# Patient Record
Sex: FEMALE | Race: BLACK OR AFRICAN AMERICAN | HISPANIC OR LATINO | URBAN - NONMETROPOLITAN AREA
[De-identification: names, ages, dates, MRNs, and addresses within clinical notes are randomized per-mention and may not be internally consistent; named-entity substitution may affect disease eponyms.]

---
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
## 2023-01-14 ENCOUNTER — HOSPITAL ENCOUNTER (EMERGENCY)
Facility: HOSPITAL | Age: 18
Discharge: HOME | End: 2023-01-14
Attending: EMERGENCY MEDICINE

## 2023-01-14 VITALS
OXYGEN SATURATION: 99 % | DIASTOLIC BLOOD PRESSURE: 69 MMHG | HEART RATE: 108 BPM | SYSTOLIC BLOOD PRESSURE: 130 MMHG | RESPIRATION RATE: 16 BRPM | TEMPERATURE: 97.2 F

## 2023-01-14 DIAGNOSIS — O23.40 UTI IN PREGNANCY: ICD-10-CM

## 2023-01-14 DIAGNOSIS — R10.2 PELVIC CRAMPING: Primary | ICD-10-CM

## 2023-01-14 LAB
ABO GROUP BLD: NORMAL
ABO GROUP BLD: NORMAL
ALBUMIN SERPL BCP-MCNC: 3.6 G/DL (ref 3.5–5)
ALP SERPL-CCNC: 43 U/L (ref 46–384)
ALT SERPL W P-5'-P-CCNC: 18 U/L (ref 12–78)
ANION GAP SERPL CALCULATED.3IONS-SCNC: 9 MMOL/L (ref 4–13)
AST SERPL W P-5'-P-CCNC: 18 U/L (ref 5–45)
BACTERIA UR QL AUTO: ABNORMAL /HPF
BASOPHILS # BLD AUTO: 0.05 THOUSANDS/ÂΜL (ref 0–0.1)
BASOPHILS NFR BLD AUTO: 1 % (ref 0–1)
BILIRUB SERPL-MCNC: 0.18 MG/DL (ref 0.2–1)
BILIRUB UR QL STRIP: NEGATIVE
BLD GP AB SCN SERPL QL: NEGATIVE
BUN SERPL-MCNC: 11 MG/DL (ref 5–25)
CALCIUM SERPL-MCNC: 9.3 MG/DL (ref 8.3–10.1)
CHLORIDE SERPL-SCNC: 102 MMOL/L (ref 96–108)
CLARITY UR: ABNORMAL
CO2 SERPL-SCNC: 24 MMOL/L (ref 21–32)
COLOR UR: YELLOW
CREAT SERPL-MCNC: 0.59 MG/DL (ref 0.6–1.3)
EOSINOPHIL # BLD AUTO: 0.16 THOUSAND/ÂΜL (ref 0–0.61)
EOSINOPHIL NFR BLD AUTO: 2 % (ref 0–6)
ERYTHROCYTE [DISTWIDTH] IN BLOOD BY AUTOMATED COUNT: 12.5 % (ref 11.6–15.1)
EXT PREGNANCY TEST URINE: POSITIVE
EXT. CONTROL: ABNORMAL
GFR SERPL CREATININE-BSD FRML MDRD: 133 ML/MIN/1.73SQ M
GLUCOSE SERPL-MCNC: 70 MG/DL (ref 65–140)
GLUCOSE UR STRIP-MCNC: NEGATIVE MG/DL
HCT VFR BLD AUTO: 35.6 % (ref 34.8–46.1)
HGB BLD-MCNC: 12.4 G/DL (ref 11.5–15.4)
HGB UR QL STRIP.AUTO: NEGATIVE
IMM GRANULOCYTES # BLD AUTO: 0.04 THOUSAND/UL (ref 0–0.2)
IMM GRANULOCYTES NFR BLD AUTO: 0 % (ref 0–2)
KETONES UR STRIP-MCNC: NEGATIVE MG/DL
LEUKOCYTE ESTERASE UR QL STRIP: ABNORMAL
LYMPHOCYTES # BLD AUTO: 3.17 THOUSANDS/ÂΜL (ref 0.6–4.47)
LYMPHOCYTES NFR BLD AUTO: 33 % (ref 14–44)
MCH RBC QN AUTO: 30.6 PG (ref 26.8–34.3)
MCHC RBC AUTO-ENTMCNC: 34.8 G/DL (ref 31.4–37.4)
MCV RBC AUTO: 88 FL (ref 82–98)
MONOCYTES # BLD AUTO: 0.61 THOUSAND/ÂΜL (ref 0.17–1.22)
MONOCYTES NFR BLD AUTO: 6 % (ref 4–12)
NEUTROPHILS # BLD AUTO: 5.61 THOUSANDS/ÂΜL (ref 1.85–7.62)
NEUTS SEG NFR BLD AUTO: 58 % (ref 43–75)
NITRITE UR QL STRIP: NEGATIVE
NON-SQ EPI CELLS URNS QL MICRO: ABNORMAL /HPF
NRBC BLD AUTO-RTO: 0 /100 WBCS
OTHER STN SPEC: ABNORMAL
PH UR STRIP.AUTO: 6 [PH]
PLATELET # BLD AUTO: 210 THOUSANDS/UL (ref 149–390)
PMV BLD AUTO: 11.1 FL (ref 8.9–12.7)
POTASSIUM SERPL-SCNC: 3.3 MMOL/L (ref 3.5–5.3)
PROT SERPL-MCNC: 7.4 G/DL (ref 6.4–8.4)
PROT UR STRIP-MCNC: NEGATIVE MG/DL
RBC # BLD AUTO: 4.05 MILLION/UL (ref 3.81–5.12)
RBC #/AREA URNS AUTO: ABNORMAL /HPF
RH BLD: NEGATIVE
RH BLD: NEGATIVE
SODIUM SERPL-SCNC: 135 MMOL/L (ref 135–147)
SP GR UR STRIP.AUTO: >=1.03 (ref 1–1.03)
SPECIMEN EXPIRATION DATE: NORMAL
UROBILINOGEN UR QL STRIP.AUTO: 0.2 E.U./DL
WBC # BLD AUTO: 9.64 THOUSAND/UL (ref 4.31–10.16)
WBC #/AREA URNS AUTO: ABNORMAL /HPF

## 2023-01-14 RX ORDER — CEPHALEXIN 250 MG/1
500 CAPSULE ORAL ONCE
Status: COMPLETED | OUTPATIENT
Start: 2023-01-14 | End: 2023-01-14

## 2023-01-14 RX ORDER — CEPHALEXIN 500 MG/1
500 CAPSULE ORAL EVERY 6 HOURS SCHEDULED
Qty: 28 CAPSULE | Refills: 0 | Status: SHIPPED | OUTPATIENT
Start: 2023-01-14 | End: 2023-01-17 | Stop reason: ALTCHOICE

## 2023-01-14 RX ADMIN — CEPHALEXIN 500 MG: 250 CAPSULE ORAL at 04:53

## 2023-01-14 NOTE — DISCHARGE INSTRUCTIONS
Please follow up with OB for further management of your pregnancy  Their information is above  Please take keflex as prescribed  Follow all return precautions  Thank you

## 2023-01-14 NOTE — ED PROVIDER NOTES
History  Chief Complaint   Patient presents with   • Medical Problem     Pt states she thinks shes pregnant     12-year-old female, recent positive pregnancy test, , who presents for concern for miscarriage  Patient states that she has had multiple positive pregnancy tests at home  She describes that she has had some light bleeding, however this was noticed approximately a month ago, she has not had bleeding since  She denies any active bleeding  No active vaginal discharge  Does describe some mild abdominal cramping and states that she thinks she may have a UTI  She also describes some frequency  No dysuria  No flank pain  No lightheadedness  ROS otherwise negative  None       History reviewed  No pertinent past medical history  History reviewed  No pertinent surgical history  History reviewed  No pertinent family history  I have reviewed and agree with the history as documented  E-Cigarette/Vaping   • E-Cigarette Use Current Every Day User      E-Cigarette/Vaping Substances   • Nicotine Yes    • THC No    • CBD No    • Flavoring Yes      Social History     Tobacco Use   • Smoking status: Never   • Smokeless tobacco: Never   Vaping Use   • Vaping Use: Every day   • Substances: Nicotine, Flavoring   Substance Use Topics   • Alcohol use: Yes     Alcohol/week: 1 0 standard drink     Types: 1 Standard drinks or equivalent per week   • Drug use: Yes     Types: Marijuana       Review of Systems   Constitutional: Negative for chills and fever  HENT: Negative for congestion, rhinorrhea and sore throat  Respiratory: Negative for cough and shortness of breath  Cardiovascular: Negative for chest pain and palpitations  Gastrointestinal: Positive for abdominal pain  Negative for constipation, diarrhea, nausea and vomiting  Genitourinary: Positive for frequency  Negative for difficulty urinating, flank pain and vaginal bleeding  Musculoskeletal: Negative for arthralgias  Neurological: Negative for dizziness, weakness, light-headedness and headaches  Psychiatric/Behavioral: Negative for agitation, behavioral problems and confusion  All other systems reviewed and are negative  Physical Exam  Physical Exam  Constitutional:       Appearance: She is well-developed  Comments: Well-appearing   HENT:      Head: Normocephalic and atraumatic  Right Ear: Tympanic membrane normal       Left Ear: Tympanic membrane normal       Nose: Nose normal       Mouth/Throat:      Mouth: Mucous membranes are moist    Eyes:      Pupils: Pupils are equal, round, and reactive to light  Cardiovascular:      Rate and Rhythm: Normal rate and regular rhythm  Heart sounds: Normal heart sounds  No murmur heard  No friction rub  Pulmonary:      Effort: Pulmonary effort is normal  No respiratory distress  Breath sounds: Normal breath sounds  No wheezing or rales  Abdominal:      General: Bowel sounds are normal  There is no distension  Palpations: Abdomen is soft  Tenderness: There is no abdominal tenderness  There is no right CVA tenderness or left CVA tenderness  Musculoskeletal:         General: Normal range of motion  Cervical back: Normal range of motion and neck supple  Skin:     General: Skin is warm  Neurological:      Mental Status: She is alert and oriented to person, place, and time  Coordination: Coordination normal    Psychiatric:         Behavior: Behavior normal          Thought Content:  Thought content normal          Judgment: Judgment normal          Vital Signs  ED Triage Vitals [01/14/23 0354]   Temperature Pulse Respirations Blood Pressure SpO2   (!) 97 2 °F (36 2 °C) 103 16 139/81 100 %      Temp Source Heart Rate Source Patient Position - Orthostatic VS BP Location FiO2 (%)   Temporal Monitor Sitting Left arm --      Pain Score       No Pain           Vitals:    01/14/23 0354 01/14/23 0453   BP: 139/81 130/69   Pulse: 103 (!) 108 Patient Position - Orthostatic VS: Sitting Sitting         Visual Acuity      ED Medications  Medications   cephalexin (KEFLEX) capsule 500 mg (500 mg Oral Given 1/14/23 0453)       Diagnostic Studies  Results Reviewed     Procedure Component Value Units Date/Time    Comprehensive metabolic panel [[id number]]  (Abnormal) Collected: 01/14/23 0402    Lab Status: Final result Specimen: Blood from Arm, Right Updated: 01/14/23 0426     Sodium 135 mmol/L      Potassium 3 3 mmol/L      Chloride 102 mmol/L      CO2 24 mmol/L      ANION GAP 9 mmol/L      BUN 11 mg/dL      Creatinine 0 59 mg/dL      Glucose 70 mg/dL      Calcium 9 3 mg/dL      AST 18 U/L      ALT 18 U/L      Alkaline Phosphatase 43 U/L      Total Protein 7 4 g/dL      Albumin 3 6 g/dL      Total Bilirubin 0 18 mg/dL      eGFR 133 ml/min/1 73sq m     Narrative:      [place] guidelines for Chronic Kidney Disease (CKD):   •  Stage 1 with normal or high GFR (GFR > 90 mL/min/1 73 square meters)  •  Stage 2 Mild CKD (GFR = 60-89 mL/min/1 73 square meters)  •  Stage 3A Moderate CKD (GFR = 45-59 mL/min/1 73 square meters)  •  Stage 3B Moderate CKD (GFR = 30-44 mL/min/1 73 square meters)  •  Stage 4 Severe CKD (GFR = 15-29 mL/min/1 73 square meters)  •  Stage 5 End Stage CKD (GFR <15 mL/min/1 73 square meters)  Note: GFR calculation is accurate only with a steady state creatinine    Urine Microscopic [[id number]]  (Abnormal) Collected: 01/14/23 0405    Lab Status: Final result Specimen: Urine, Clean Catch Updated: 01/14/23 0424     RBC, UA 0-1 /hpf      WBC, UA 10-20 /hpf      Epithelial Cells Moderate /hpf      Bacteria, UA Innumerable /hpf      OTHER OBSERVATIONS Yeast Cells Present    Urine culture [[id number]] Collected: 01/14/23 0405    Lab Status:  In process Specimen: Urine, Clean Catch Updated: 01/14/23 0424    UA w Reflex to Microscopic w Reflex to Culture [[id number]]  (Abnormal) Collected: 01/14/23 0405    Lab Status: Final result Specimen: Urine, Clean Catch Updated: 01/14/23 0414     Color, UA Yellow     Clarity, UA Slightly Cloudy     Specific Gravity, UA >=1 030     pH, UA 6 0     Leukocytes, UA Small     Nitrite, UA Negative     Protein, UA Negative mg/dl      Glucose, UA Negative mg/dl      Ketones, UA Negative mg/dl      Urobilinogen, UA 0 2 E U /dl      Bilirubin, UA Negative     Occult Blood, UA Negative    CBC and differential [[id number]] Collected: 01/14/23 0402    Lab Status: Final result Specimen: Blood from Arm, Right Updated: 01/14/23 0413     WBC 9 64 Thousand/uL      RBC 4 05 Million/uL      Hemoglobin 12 4 g/dL      Hematocrit 35 6 %      MCV 88 fL      MCH 30 6 pg      MCHC 34 8 g/dL      RDW 12 5 %      MPV 11 1 fL      Platelets 600 Thousands/uL      nRBC 0 /100 WBCs      Neutrophils Relative 58 %      Immat GRANS % 0 %      Lymphocytes Relative 33 %      Monocytes Relative 6 %      Eosinophils Relative 2 %      Basophils Relative 1 %      Neutrophils Absolute 5 61 Thousands/µL      Immature Grans Absolute 0 04 Thousand/uL      Lymphocytes Absolute 3 17 Thousands/µL      Monocytes Absolute 0 61 Thousand/µL      Eosinophils Absolute 0 16 Thousand/µL      Basophils Absolute 0 05 Thousands/µL     POCT pregnancy, urine [[id number]]  (Abnormal) Resulted: 01/14/23 0405    Lab Status: Final result Updated: 01/14/23 0405     EXT Preg Test, Ur Positive     Control Valid                 No orders to display              Procedures  POC Pelvic US    Date/Time: 1/14/2023 4:39 AM  Performed by: Natty Matias MD  Authorized by: Natty Matias MD     Patient location:  ED  Other Assisting Provider: No    Procedure details:     Exam Type:  Diagnostic    Indications: evaluate for IUP      Assessment for: confirm intrauterine pregnancy and evaluate fetal viability      Technique:  Transabdominal obstetric (HCG+) exam    Image quality: diagnostic      Image availability:  Images available in PACS  Uterine findings:     Intrauterine pregnancy: identified      Single gestation: identified      Fetal heart rate: identified      Fetal heart rate (bpm):  166  Other findings:     Free pelvic fluid: not identified      Free peritoneal fluid: not identified    Interpretation:     Ultrasound impressions: normal      Pregnancy findings: intrauterine pregnancy (IUP)               ED Course  ED Course as of 23 1630   Sat 2023   0431 Bacteria, UA(!): Innumerable   0431 Leukocytes, UA(!): Small                                             Medical Decision Making  Differential includes UTI, spontaneous , ectopic pregnancy  Patient's presentation is very minimally concerning for spontaneous  at this time, given that she had light bleeding approximately 1 month ago and has not had any bleeding since  Bedside ultrasound demonstrated an intrauterine pregnancy with a fetal heart rate of 166  I discussed the case case with OB, and given timeline of bleeding, with no active bleeding, as well as how early the pregnancy is given her last period was , will not give RhoGAM at this time  Patient's UA was consistent with UTI, started Keflex in the emergency department and discharged with a prescription  I also provided OB follow-up as the patient has not follow-up with an obstetrician yet  Should return precautions were given and the patient was discharged  Pelvic cramping: acute illness or injury  UTI in pregnancy: acute illness or injury  Amount and/or Complexity of Data Reviewed  Labs: ordered  Decision-making details documented in ED Course  Risk  Prescription drug management            Disposition  Final diagnoses:   Pelvic cramping   UTI in pregnancy     Time reflects when diagnosis was documented in both MDM as applicable and the Disposition within this note     Time User Action Codes Description Comment    2023  4:47 AM Carmelita Drake Add [N39 0] UTI (urinary tract infection)     1/14/2023  4:47 AM Noelle, Miah Baxter Add [R10 2] Pelvic cramping     1/14/2023  4:47 AM Chris Fraser Modify [R10 2] Pelvic cramping     1/14/2023  4:47 AM Noelle, Miah Baxter Remove [N39 0] UTI (urinary tract infection)     1/14/2023  4:47 AM Noelle, Miah Baxter Add [O23 40] UTI in pregnancy       ED Disposition     ED Disposition   Discharge    Condition   Stable    Date/Time   Sat Jan 14, 2023  4:47 AM    Comment   Liset Hardy discharge to home/self care  Follow-up Information     Follow up With Specialties Details Why Contact Info Additional [address] [facility] Call  For re-evaluation [address] [id number]  [facility] [address] [place], [place], [address]          Discharge Medication List as of 1/14/2023  4:50 AM      START taking these medications    Details   cephalexin (KEFLEX) 500 mg capsule Take 1 capsule (500 mg total) by mouth every 6 (six) hours for 7 days, Starting Sat 1/14/2023, Until Sat 1/21/2023, Normal             No discharge procedures on file      PDMP Review     None          ED Provider  Electronically Signed by           Christi Gonzalez MD  01/14/23 3599

## 2023-01-17 DIAGNOSIS — O23.40 URINARY TRACT INFECTION IN MOTHER DURING PREGNANCY, ANTEPARTUM: Primary | ICD-10-CM

## 2023-01-17 LAB — BACTERIA UR CULT: ABNORMAL

## 2023-01-17 RX ORDER — CEFUROXIME AXETIL 500 MG/1
500 TABLET ORAL EVERY 12 HOURS SCHEDULED
Qty: 14 TABLET | Refills: 0 | Status: SHIPPED | OUTPATIENT
Start: 2023-01-17 | End: 2023-01-24

## 2023-01-17 NOTE — RESULT ENCOUNTER NOTE
Number for patient and significant other not in service  Called grandmother, no answer  Mailbox full, unable to leave message  Needs antibiotics changed

## 2023-01-17 NOTE — RESULT ENCOUNTER NOTE
Boyfriend's grandmother called back and put patient on phone  Verified name and   Advised to stop keflex, start ceftin 500 bid x 7 days  Sent to preferred pharmacy   Also advised to schedule f/u with obgyn

## 2023-01-21 ENCOUNTER — HOSPITAL ENCOUNTER (EMERGENCY)
Facility: HOSPITAL | Age: 18
Discharge: HOME | End: 2023-01-21
Attending: EMERGENCY MEDICINE

## 2023-01-21 VITALS
SYSTOLIC BLOOD PRESSURE: 152 MMHG | OXYGEN SATURATION: 97 % | DIASTOLIC BLOOD PRESSURE: 87 MMHG | WEIGHT: 145 LBS | BODY MASS INDEX: 24.75 KG/M2 | HEIGHT: 64 IN | TEMPERATURE: 97.7 F | RESPIRATION RATE: 20 BRPM | HEART RATE: 99 BPM

## 2023-01-21 DIAGNOSIS — B37.31 YEAST INFECTION OF THE VAGINA: Primary | ICD-10-CM

## 2023-01-21 RX ORDER — CLOTRIMAZOLE 1 %
1 CREAM WITH APPLICATOR VAGINAL
Qty: 45 G | Refills: 1 | Status: SHIPPED | OUTPATIENT
Start: 2023-01-21 | End: 2023-01-21 | Stop reason: SDUPTHER

## 2023-01-21 RX ORDER — CLOTRIMAZOLE 1 %
1 CREAM WITH APPLICATOR VAGINAL
Qty: 45 G | Refills: 0 | Status: SHIPPED | OUTPATIENT
Start: 2023-01-21 | End: 2023-01-28

## 2023-01-22 NOTE — ED PROVIDER NOTES
History  Chief Complaint   Patient presents with   • Medical Problem     Patient started with a yeast infection a few days ago after her dose of abx for a uti     Patient presents to the emergency department today via private vehicle alone  She provides her own history stating that a week ago she was diagnosed with a UTI started on cephalexin however was called and told that there was resistance and she should start Ceftin, she is planning on picking that up tomorrow  She has had urinary urgency frequency and burning in regards to the urinary tract infection however she has developed a white vaginal discharge consistent with prior yeast infections that she has had  She has not offered any other self treatments at home  No abdominal pain no fever she is approximate 13 weeks pregnant awaiting follow-up with OB/GYN for initial appointment  No vaginal bleeding  Prior to Admission Medications   Prescriptions Last Dose Informant Patient Reported? Taking? cefuroxime (CEFTIN) 500 mg tablet   No No   Sig: Take 1 tablet (500 mg total) by mouth every 12 (twelve) hours for 7 days      Facility-Administered Medications: None       History reviewed  No pertinent past medical history  History reviewed  No pertinent surgical history  History reviewed  No pertinent family history  I have reviewed and agree with the history as documented  E-Cigarette/Vaping   • E-Cigarette Use Current Every Day User      E-Cigarette/Vaping Substances   • Nicotine Yes    • THC No    • CBD No    • Flavoring Yes      Social History     Tobacco Use   • Smoking status: Never   • Smokeless tobacco: Never   Vaping Use   • Vaping Use: Every day   • Substances: Nicotine, Flavoring   Substance Use Topics   • Alcohol use: Yes     Alcohol/week: 1 0 standard drink     Types: 1 Standard drinks or equivalent per week   • Drug use: Yes     Types: Marijuana       Review of Systems   Constitutional: Negative for chills and fever     HENT: Negative for ear pain and sore throat  Eyes: Negative for pain and visual disturbance  Respiratory: Negative for cough and shortness of breath  Cardiovascular: Negative for chest pain and palpitations  Gastrointestinal: Negative for abdominal pain and vomiting  Genitourinary: Positive for dysuria, frequency, urgency and vaginal discharge  Negative for hematuria and vaginal bleeding  Musculoskeletal: Negative for arthralgias and back pain  Skin: Negative for color change and rash  Neurological: Negative for seizures and syncope  All other systems reviewed and are negative  Physical Exam  Physical Exam  Vitals reviewed  Constitutional:       General: She is not in acute distress  Appearance: Normal appearance  She is not ill-appearing, toxic-appearing or diaphoretic  HENT:      Head: Normocephalic and atraumatic  Right Ear: External ear normal       Left Ear: External ear normal    Eyes:      General: No scleral icterus  Right eye: No discharge  Left eye: No discharge  Extraocular Movements: Extraocular movements intact  Conjunctiva/sclera: Conjunctivae normal    Cardiovascular:      Rate and Rhythm: Normal rate  Pulses: Normal pulses  Pulmonary:      Effort: Pulmonary effort is normal  No respiratory distress  Breath sounds: No stridor  Musculoskeletal:         General: No deformity or signs of injury  Cervical back: Normal range of motion  No rigidity  Skin:     General: Skin is warm  Coloration: Skin is not jaundiced  Findings: No lesion or rash  Neurological:      General: No focal deficit present  Mental Status: She is alert and oriented to person, place, and time  Mental status is at baseline  Gait: Gait normal    Psychiatric:         Mood and Affect: Mood normal          Thought Content:  Thought content normal          Judgment: Judgment normal          Vital Signs  ED Triage Vitals [01/21/23 1958] Temperature Pulse Respirations Blood Pressure SpO2   97 7 °F (36 5 °C) 99 20 152/87 97 %      Temp Source Heart Rate Source Patient Position - Orthostatic VS BP Location FiO2 (%)   Temporal Monitor Sitting Left arm --      Pain Score       No Pain           Vitals:    01/21/23 1958   BP: 152/87   Pulse: 99   Patient Position - Orthostatic VS: Sitting         Visual Acuity      ED Medications  Medications - No data to display    Diagnostic Studies  Results Reviewed     Procedure Component Value Units Date/Time    UA w Reflex to Microscopic w Reflex to Culture [[id number]]     Lab Status: No result Specimen: Urine                  No orders to display              Procedures  Procedures         ED Course  ED Course as of 01/21/23 2017   Sat Jan 21, 2023 2000 SpO2: 97 %   2000 Respirations: 20   2000 Pulse: 99   2000 Temperature: 97 7 °F (36 5 °C)   2000 Blood Pressure: 152/87                                             MDM    Disposition  Final diagnoses:   Yeast infection of the vagina     Time reflects when diagnosis was documented in both MDM as applicable and the Disposition within this note     Time User Action Codes Description Comment    1/21/2023  8:05 PM Ilana WRAY Add [B37 31] Yeast infection of the vagina       ED Disposition     ED Disposition   Discharge    Condition   Stable    Date/Time   Sat Jan 21, 2023  8:05 PM    Comment   Maya Hale discharge to home/self care                 Follow-up Information    None         Current Discharge Medication List      START taking these medications    Details   clotrimazole (GYNE-LOTRIMIN) 1 % vaginal cream Insert 1 applicator into the vagina daily at bedtime for 7 days  Qty: 45 g, Refills: 0    Associated Diagnoses: Yeast infection of the vagina         CONTINUE these medications which have NOT CHANGED    Details   cefuroxime (CEFTIN) 500 mg tablet Take 1 tablet (500 mg total) by mouth every 12 (twelve) hours for 7 days  Qty: 14 tablet, Refills: 0 Associated Diagnoses: Urinary tract infection in mother during pregnancy, antepartum             No discharge procedures on file      PDMP Review     None          ED Provider  Electronically Signed by           Naty Schmid PA-C  01/21/23 2015       Naty Schmid PA-C  01/21/23 2017

## 2023-02-11 ENCOUNTER — HOSPITAL ENCOUNTER (EMERGENCY)
Facility: HOSPITAL | Age: 18
Discharge: HOME | End: 2023-02-11
Attending: EMERGENCY MEDICINE

## 2023-02-11 VITALS
TEMPERATURE: 97.8 F | OXYGEN SATURATION: 100 % | RESPIRATION RATE: 16 BRPM | HEIGHT: 64 IN | WEIGHT: 148.37 LBS | HEART RATE: 94 BPM | DIASTOLIC BLOOD PRESSURE: 79 MMHG | SYSTOLIC BLOOD PRESSURE: 141 MMHG | BODY MASS INDEX: 25.33 KG/M2

## 2023-02-11 DIAGNOSIS — K62.5 BRBPR (BRIGHT RED BLOOD PER RECTUM): Primary | ICD-10-CM

## 2023-02-11 DIAGNOSIS — O23.42 URINARY TRACT INFECTION IN MOTHER DURING SECOND TRIMESTER OF PREGNANCY: ICD-10-CM

## 2023-02-11 LAB
AMORPH PHOS CRY URNS QL MICRO: ABNORMAL /HPF
BACTERIA UR QL AUTO: ABNORMAL /HPF
BILIRUB UR QL STRIP: NEGATIVE
CLARITY UR: ABNORMAL
COLOR UR: YELLOW
GLUCOSE UR STRIP-MCNC: NEGATIVE MG/DL
HGB UR QL STRIP.AUTO: NEGATIVE
KETONES UR STRIP-MCNC: NEGATIVE MG/DL
LEUKOCYTE ESTERASE UR QL STRIP: ABNORMAL
NITRITE UR QL STRIP: NEGATIVE
NON-SQ EPI CELLS URNS QL MICRO: ABNORMAL /HPF
PH UR STRIP.AUTO: 8 [PH]
PROT UR STRIP-MCNC: NEGATIVE MG/DL
RBC #/AREA URNS AUTO: ABNORMAL /HPF
SP GR UR STRIP.AUTO: 1.01 (ref 1–1.03)
UROBILINOGEN UR QL STRIP.AUTO: 0.2 E.U./DL
WBC #/AREA URNS AUTO: ABNORMAL /HPF

## 2023-02-11 RX ORDER — NITROFURANTOIN 25; 75 MG/1; MG/1
100 CAPSULE ORAL ONCE
Status: COMPLETED | OUTPATIENT
Start: 2023-02-11 | End: 2023-02-11

## 2023-02-11 RX ORDER — NITROFURANTOIN 25; 75 MG/1; MG/1
100 CAPSULE ORAL 2 TIMES DAILY
Qty: 14 CAPSULE | Refills: 0 | Status: SHIPPED | OUTPATIENT
Start: 2023-02-11 | End: 2023-02-11 | Stop reason: SDUPTHER

## 2023-02-11 RX ORDER — NITROFURANTOIN 25; 75 MG/1; MG/1
100 CAPSULE ORAL 2 TIMES DAILY
Qty: 14 CAPSULE | Refills: 0 | Status: SHIPPED | OUTPATIENT
Start: 2023-02-11 | End: 2023-02-18

## 2023-02-11 RX ORDER — POLYETHYLENE GLYCOL 3350 17 G/17G
17 POWDER, FOR SOLUTION ORAL DAILY
Qty: 10 EACH | Refills: 0 | Status: SHIPPED | OUTPATIENT
Start: 2023-02-11 | End: 2023-02-11 | Stop reason: SDUPTHER

## 2023-02-11 RX ORDER — DIAPER,BRIEF,INFANT-TODD,DISP
EACH MISCELLANEOUS
Qty: 15 G | Refills: 0 | Status: SHIPPED | OUTPATIENT
Start: 2023-02-11 | End: 2023-02-11 | Stop reason: SDUPTHER

## 2023-02-11 RX ORDER — DIAPER,BRIEF,INFANT-TODD,DISP
EACH MISCELLANEOUS
Qty: 15 G | Refills: 0 | Status: SHIPPED | OUTPATIENT
Start: 2023-02-11

## 2023-02-11 RX ORDER — POLYETHYLENE GLYCOL 3350 17 G/17G
17 POWDER, FOR SOLUTION ORAL DAILY
Qty: 10 EACH | Refills: 0 | Status: SHIPPED | OUTPATIENT
Start: 2023-02-11

## 2023-02-11 RX ADMIN — NITROFURANTOIN (MONOHYDRATE/MACROCRYSTALS) 100 MG: 75; 25 CAPSULE ORAL at 23:34

## 2023-02-12 NOTE — ED PROVIDER NOTES
History  Chief Complaint   Patient presents with   • Rectal Bleeding     Patient noticing blood when wiping after bowel movements x3 days; no hx hemorrhoids; patient is pregnant and believes she is about 4 months along     Cande Pate is a 21y o  year old  female reportedly 14 weeks pregnant presenting to the [facility] ED for rectal bleeding  Patient has had three days of bright red blood per rectum  Patient noticed streaks of bright red blood and small bright red clots of blood on toilet paper after passing bowel movements  The patient denies any rectal pain with defecation  No reported history of hemorrhoids or rectal bleeding in the past   Patient denies fever/chills, nausea/vomiting or abdominal pain  No dysuria/hematuria/flank pain  Patient has not taken/received any medications at home for relief of symptoms    OB history notable for one prior spontaneous miscarriage  Patient denies history of prior abdominal surgeries  Patient has not yet established care with OB/GYN during this pregnancy  History provided by:  Patient   used: No        None       No past medical history on file  No past surgical history on file  No family history on file  I have reviewed and agree with the history as documented  E-Cigarette/Vaping   • E-Cigarette Use Current Every Day User      E-Cigarette/Vaping Substances   • Nicotine Yes    • THC No    • CBD No    • Flavoring Yes      Social History     Tobacco Use   • Smoking status: Never   • Smokeless tobacco: Never   Vaping Use   • Vaping Use: Every day   • Substances: Nicotine, Flavoring   Substance Use Topics   • Alcohol use: Yes     Alcohol/week: 1 0 standard drink     Types: 1 Standard drinks or equivalent per week   • Drug use: Yes     Types: Marijuana       Review of Systems   Constitutional: Negative for fever  Respiratory: Negative for shortness of breath  Cardiovascular: Negative for chest pain     Gastrointestinal: Positive for anal bleeding and blood in stool  Negative for abdominal pain, constipation, diarrhea, nausea, rectal pain and vomiting  Genitourinary: Negative for dysuria, flank pain, frequency, hematuria, pelvic pain, vaginal bleeding, vaginal discharge and vaginal pain  Musculoskeletal: Negative for arthralgias  All other systems reviewed and are negative  Physical Exam  Physical Exam  Vitals and nursing note reviewed  Constitutional:       General: She is not in acute distress  Appearance: Normal appearance  She is well-developed  She is not ill-appearing, toxic-appearing or diaphoretic  HENT:      Head: Normocephalic and atraumatic  Nose: No congestion or rhinorrhea  Eyes:      General:         Right eye: No discharge  Left eye: No discharge  Cardiovascular:      Rate and Rhythm: Normal rate and regular rhythm  Pulmonary:      Effort: Pulmonary effort is normal  No accessory muscle usage or respiratory distress  Breath sounds: Normal breath sounds  No stridor  No decreased breath sounds, wheezing, rhonchi or rales  Abdominal:      General: There is distension (Gravid abdomen)  Palpations: Abdomen is soft  Tenderness: There is no abdominal tenderness  There is no right CVA tenderness, left CVA tenderness, guarding or rebound  Genitourinary:     Comments: Patient declines rectal/pelvic exam   Musculoskeletal:      Cervical back: Normal range of motion  No rigidity  Right lower leg: No tenderness  No edema  Left lower leg: No tenderness  No edema  Skin:     Capillary Refill: Capillary refill takes less than 2 seconds  Findings: No rash  Neurological:      Mental Status: She is alert and oriented to person, place, and time     Psychiatric:         Mood and Affect: Mood normal          Behavior: Behavior normal          Vital Signs  ED Triage Vitals [02/11/23 2209]   Temperature Pulse Respirations Blood Pressure SpO2   97 8 °F (36 6 °C) 94 16 141/79 100 % Temp Source Heart Rate Source Patient Position - Orthostatic VS BP Location FiO2 (%)   Temporal Monitor -- -- --      Pain Score       No Pain           Vitals:    02/11/23 2209   BP: 141/79   Pulse: 94         Visual Acuity      ED Medications  Medications   nitrofurantoin (MACROBID) extended-release capsule 100 mg (100 mg Oral Given 2/11/23 2334)       Diagnostic Studies  Results Reviewed     Procedure Component Value Units Date/Time    Urine Microscopic [[id number]]  (Abnormal) Collected: 02/11/23 2234    Lab Status: Final result Specimen: Urine, Clean Catch Updated: 02/11/23 2316     RBC, UA       Field obscured, unable to enumerate     /hpf     WBC, UA       Field obscured, unable to enumerate     /hpf     Epithelial Cells       Field obscured, unable to enumerate     /hpf     Bacteria, UA Innumerable /hpf      AMORPH PHOSPATES Innumerable /hpf      URINE COMMENT --    UA w Reflex to Microscopic w Reflex to Culture [[id number]]  (Abnormal) Collected: 02/11/23 2234    Lab Status: Final result Specimen: Urine, Clean Catch Updated: 02/11/23 2253     Color, UA Yellow     Clarity, UA Cloudy     Specific Gravity, UA 1 015     pH, UA 8 0     Leukocytes, UA Trace     Nitrite, UA Negative     Protein, UA Negative mg/dl      Glucose, UA Negative mg/dl      Ketones, UA Negative mg/dl      Urobilinogen, UA 0 2 E U /dl      Bilirubin, UA Negative     Occult Blood, UA Negative     URINE COMMENT --    Urine culture [[id number]] Collected: 02/11/23 2234    Lab Status:  In process Specimen: Urine, Clean Catch Updated: 02/11/23 2253                 No orders to display              Procedures  POC Pelvic US    Date/Time: 2/11/2023 11:03 PM  Performed by: Romain Osorio DO  Authorized by: Romain Osorio DO     Patient location:  ED  Procedure details:     Exam Type:  Diagnostic    Indications: evaluate for IUP      Assessment for: confirm intrauterine pregnancy and evaluate fetal viability      Technique:  Transabdominal obstetric (HCG+) exam    Views obtained: uterus (transverse and sagittal)      Image quality: diagnostic      Image availability:  Images available in PACS  Uterine findings:     Endometrial stripe: identified      Intrauterine pregnancy: identified      Single gestation: identified      Fetal heart rate: identified      Fetal heart rate (bpm):  170  Interpretation:     Ultrasound impressions: normal      Pregnancy findings: intrauterine pregnancy (IUP)               ED Course  ED Course as of 02/11/23 2350   Sat Feb 11, 2023   2306 Patient declines rectal exam with chaperone at this time  I explained that I cannot thoroughly evaluate her symptoms without rectal exam   The patient understands and continues to decline rectal exam                                              Medical Decision Making    21 y o  female presenting for rectal bleeding  Vital signs stable, patient nontoxic-appearing  Patient specifically denies hematuria, vaginal bleeding or discharge  POCUS performed as documented above and demonstrating intrauterine pregnancy with fetal heart rate 170 bpm   Symptoms appear consistent with hemorrhoids though unable to confirm this diagnosis as patient declines rectal exam   Will check UA as patient recently diagnosed with urinary tract infection  These results were reviewed with the patient and will prescribe course of Macrobid for possible recurrent UTI during pregnancy  Disposition: I have discussed with the patient our plan to discharge them from the ED and the patient is in agreement with this plan  Discharge Plan: Rx for Macrobid, will follow up culture and sensitivity  Discussed treatments for hemorrhoids including Preparation H, MiraLAX and over-the-counter sitz bath  RTED precautions emphasized  The patient was provided a written after visit summary with strict RTED precautions  Followup: I have discussed with the patient plan to follow up with OB/GYN   Contact information provided in AVS     BRBPR (bright red blood per rectum): acute illness or injury  Urinary tract infection in mother during second trimester of pregnancy: acute illness or injury  Amount and/or Complexity of Data Reviewed  Labs: ordered  Risk  Prescription drug management  Disposition  Final diagnoses:   BRBPR (bright red blood per rectum)   Urinary tract infection in mother during second trimester of pregnancy     Time reflects when diagnosis was documented in both MDM as applicable and the Disposition within this note     Time User Action Codes Description Comment    2/11/2023 11:08 PM Andrzej Abrams Add [K62 5] BRBPR (bright red blood per rectum)     2/11/2023 11:23 PM Andrzej Abrams Add [O23 42] Urinary tract infection in mother during second trimester of pregnancy       ED Disposition     ED Disposition   Discharge    Condition   Stable    Date/Time   Sat Feb 11, 2023 11:08 PM    Comment   Livan Mazariegos discharge to home/self care  Follow-up Information     Follow up With Specialties Details Why Contact Info Additional [phone] [facility] Schedule an appointment as soon as possible for a visit  To establish care  [address] [address]  [address] [place], [place], [address]          Discharge Medication List as of 2/11/2023 11:31 PM      START taking these medications    Details   hydrocortisone 1 % cream Apply to affected area 2 times daily, Normal      nitrofurantoin (MACROBID) 100 mg capsule Take 1 capsule (100 mg total) by mouth 2 (two) times a day for 7 days, Starting Sat 2/11/2023, Until Sat 2/18/2023, Normal      polyethylene glycol (MIRALAX) 17 g packet Take 17 g by mouth daily, Starting Sat 2/11/2023, Normal             No discharge procedures on file      PDMP Review     None          ED Provider  Electronically Signed by           Maite Cage DO  02/11/23 7412

## 2023-02-12 NOTE — DISCHARGE INSTRUCTIONS
You have been seen for bright red blood per rectum  Please trial the prescribed Preparation H and MiraLAX  Drink plenty of fluids  You can also trial over-the-counter sitz bath's  Return to the emergency department if you develop worsening rectal bleeding, abdominal pain, vomiting, vaginal bleeding or any other symptoms of concern  Please follow up with OB/GYN by calling the number provided

## 2023-02-14 LAB
BACTERIA UR CULT: ABNORMAL
BACTERIA UR CULT: ABNORMAL